# Patient Record
Sex: FEMALE | NOT HISPANIC OR LATINO | Employment: FULL TIME | URBAN - METROPOLITAN AREA
[De-identification: names, ages, dates, MRNs, and addresses within clinical notes are randomized per-mention and may not be internally consistent; named-entity substitution may affect disease eponyms.]

---

## 2023-03-20 ENCOUNTER — OFFICE VISIT (OUTPATIENT)
Dept: URGENT CARE | Facility: URGENT CARE | Age: 58
End: 2023-03-20
Payer: COMMERCIAL

## 2023-03-20 VITALS
OXYGEN SATURATION: 100 % | HEART RATE: 68 BPM | DIASTOLIC BLOOD PRESSURE: 81 MMHG | TEMPERATURE: 98.1 F | WEIGHT: 122.8 LBS | SYSTOLIC BLOOD PRESSURE: 150 MMHG

## 2023-03-20 DIAGNOSIS — M62.838 MUSCLE SPASM: Primary | ICD-10-CM

## 2023-03-20 PROCEDURE — 99203 OFFICE O/P NEW LOW 30 MIN: CPT | Performed by: FAMILY MEDICINE

## 2023-03-20 RX ORDER — ACETAMINOPHEN 325 MG/1
325-650 TABLET ORAL EVERY 6 HOURS PRN
COMMUNITY

## 2023-03-20 RX ORDER — CYCLOBENZAPRINE HCL 5 MG
5 TABLET ORAL 3 TIMES DAILY PRN
Qty: 10 TABLET | Refills: 0 | Status: SHIPPED | OUTPATIENT
Start: 2023-03-20

## 2023-03-20 RX ORDER — IBUPROFEN 600 MG/1
600 TABLET, FILM COATED ORAL EVERY 8 HOURS PRN
Qty: 20 TABLET | Refills: 0 | Status: SHIPPED | OUTPATIENT
Start: 2023-03-20

## 2023-03-20 RX ORDER — AMLODIPINE BESYLATE AND ATORVASTATIN CALCIUM 10; 10 MG/1; MG/1
1 TABLET, FILM COATED ORAL DAILY
COMMUNITY

## 2023-03-20 NOTE — PROGRESS NOTES
Assessment & Plan     There are no diagnoses linked to this encounter.           No follow-ups on file.    Cristian Medina MD  [facility] TRINI Pitts is a 57 year old female who presents to clinic today for the following health issues:  Chief Complaint   Patient presents with     Neck Pain     PT NOTICED NECK PAIN ON LEFT SIDE WORSENING, SHARP PAIN SOMETIME IT PAIN COMES DOWN INTO CHEST, PT WAS HAVING PAIN ON RIGHT SIDE OF NECK THE DAY BEFORE, PT ALSO HAVING PAIN IN HEAD - PAIN IN HEAD DOES NOT FEEL LIKE A HEADACHE     HPI    Feels some pain on left side of neck and head. Pain.  Started Friday on right side but better and now on left.  No numbness or tingling in arm/hands  Tylenol taken.  No injury  No change with ibuprofen.        Review of Systems        Objective    BP (!) 150/81 (BP Location: Left arm, Patient Position: Sitting, Cuff Size: Adult Regular)   Pulse 68   Temp 98.1  F (36.7  C) (Tympanic)   Wt 55.7 kg (122 lb 12.8 oz)   SpO2 100%   Physical Exam  Vitals and nursing note reviewed.   Constitutional:       Appearance: Normal appearance.   Neck:      Comments: Left sided trapezius pain  Cardiovascular:      Rate and Rhythm: Normal rate and regular rhythm.      Pulses: Normal pulses.      Heart sounds: Normal heart sounds.   Pulmonary:      Effort: Pulmonary effort is normal.      Breath sounds: Normal breath sounds.   Musculoskeletal:      Cervical back: Normal range of motion.   Neurological:      Mental Status: She is alert.